# Patient Record
Sex: MALE | Race: WHITE | Employment: OTHER | URBAN - METROPOLITAN AREA
[De-identification: names, ages, dates, MRNs, and addresses within clinical notes are randomized per-mention and may not be internally consistent; named-entity substitution may affect disease eponyms.]

---

## 2021-10-30 ENCOUNTER — APPOINTMENT (OUTPATIENT)
Dept: CT IMAGING | Age: 86
DRG: 308 | End: 2021-10-30
Payer: OTHER GOVERNMENT

## 2021-10-30 ENCOUNTER — HOSPITAL ENCOUNTER (INPATIENT)
Age: 86
LOS: 2 days | Discharge: HOME | DRG: 308 | End: 2021-11-01
Attending: STUDENT IN AN ORGANIZED HEALTH CARE EDUCATION/TRAINING PROGRAM | Admitting: FAMILY MEDICINE
Payer: OTHER GOVERNMENT

## 2021-10-30 ENCOUNTER — APPOINTMENT (OUTPATIENT)
Dept: GENERAL RADIOLOGY | Age: 86
DRG: 308 | End: 2021-10-30
Payer: OTHER GOVERNMENT

## 2021-10-30 DIAGNOSIS — R55 SYNCOPE, UNSPECIFIED SYNCOPE TYPE: ICD-10-CM

## 2021-10-30 DIAGNOSIS — R53.83 FATIGUE, UNSPECIFIED TYPE: ICD-10-CM

## 2021-10-30 DIAGNOSIS — U07.1 COVID-19: ICD-10-CM

## 2021-10-30 DIAGNOSIS — I48.91 NEW ONSET ATRIAL FIBRILLATION (HCC): Primary | ICD-10-CM

## 2021-10-30 DIAGNOSIS — G56.31 RADIAL NERVE PALSY, RIGHT: ICD-10-CM

## 2021-10-30 LAB
ALBUMIN SERPL-MCNC: 3.6 G/DL (ref 3.5–5.2)
ALP BLD-CCNC: 84 U/L (ref 40–129)
ALT SERPL-CCNC: 15 U/L (ref 0–40)
ANION GAP SERPL CALCULATED.3IONS-SCNC: 11 MMOL/L (ref 7–16)
AST SERPL-CCNC: 23 U/L (ref 0–39)
BACTERIA: NORMAL /HPF
BASOPHILS ABSOLUTE: 0.03 E9/L (ref 0–0.2)
BASOPHILS RELATIVE PERCENT: 0.7 % (ref 0–2)
BILIRUB SERPL-MCNC: 0.7 MG/DL (ref 0–1.2)
BILIRUBIN URINE: NEGATIVE
BLOOD, URINE: ABNORMAL
BUN BLDV-MCNC: 18 MG/DL (ref 6–23)
CALCIUM SERPL-MCNC: 9 MG/DL (ref 8.6–10.2)
CHLORIDE BLD-SCNC: 104 MMOL/L (ref 98–107)
CLARITY: CLEAR
CO2: 24 MMOL/L (ref 22–29)
COLOR: YELLOW
CREAT SERPL-MCNC: 1.1 MG/DL (ref 0.7–1.2)
EOSINOPHILS ABSOLUTE: 0.02 E9/L (ref 0.05–0.5)
EOSINOPHILS RELATIVE PERCENT: 0.5 % (ref 0–6)
GFR AFRICAN AMERICAN: >60
GFR NON-AFRICAN AMERICAN: >60 ML/MIN/1.73
GLUCOSE BLD-MCNC: 96 MG/DL (ref 74–99)
GLUCOSE URINE: NEGATIVE MG/DL
HCT VFR BLD CALC: 40.8 % (ref 37–54)
HEMOGLOBIN: 13 G/DL (ref 12.5–16.5)
IMMATURE GRANULOCYTES #: 0.01 E9/L
IMMATURE GRANULOCYTES %: 0.2 % (ref 0–5)
KETONES, URINE: NEGATIVE MG/DL
LEUKOCYTE ESTERASE, URINE: NEGATIVE
LYMPHOCYTES ABSOLUTE: 0.93 E9/L (ref 1.5–4)
LYMPHOCYTES RELATIVE PERCENT: 21.6 % (ref 20–42)
MCH RBC QN AUTO: 29.1 PG (ref 26–35)
MCHC RBC AUTO-ENTMCNC: 31.9 % (ref 32–34.5)
MCV RBC AUTO: 91.5 FL (ref 80–99.9)
MONOCYTES ABSOLUTE: 0.73 E9/L (ref 0.1–0.95)
MONOCYTES RELATIVE PERCENT: 17 % (ref 2–12)
NEUTROPHILS ABSOLUTE: 2.58 E9/L (ref 1.8–7.3)
NEUTROPHILS RELATIVE PERCENT: 60 % (ref 43–80)
NITRITE, URINE: NEGATIVE
PDW BLD-RTO: 15.6 FL (ref 11.5–15)
PH UA: 6.5 (ref 5–9)
PLATELET # BLD: 214 E9/L (ref 130–450)
PMV BLD AUTO: 9.3 FL (ref 7–12)
POTASSIUM REFLEX MAGNESIUM: 4.5 MMOL/L (ref 3.5–5)
PROTEIN UA: NEGATIVE MG/DL
RBC # BLD: 4.46 E12/L (ref 3.8–5.8)
RBC UA: NORMAL /HPF (ref 0–2)
SARS-COV-2, NAAT: DETECTED
SODIUM BLD-SCNC: 139 MMOL/L (ref 132–146)
SPECIFIC GRAVITY UA: 1.02 (ref 1–1.03)
TOTAL PROTEIN: 7.1 G/DL (ref 6.4–8.3)
TROPONIN, HIGH SENSITIVITY: 27 NG/L (ref 0–11)
TROPONIN, HIGH SENSITIVITY: 29 NG/L (ref 0–11)
UROBILINOGEN, URINE: 0.2 E.U./DL
WBC # BLD: 4.3 E9/L (ref 4.5–11.5)
WBC UA: NORMAL /HPF (ref 0–5)

## 2021-10-30 PROCEDURE — 70450 CT HEAD/BRAIN W/O DYE: CPT

## 2021-10-30 PROCEDURE — 84484 ASSAY OF TROPONIN QUANT: CPT

## 2021-10-30 PROCEDURE — 93005 ELECTROCARDIOGRAM TRACING: CPT | Performed by: STUDENT IN AN ORGANIZED HEALTH CARE EDUCATION/TRAINING PROGRAM

## 2021-10-30 PROCEDURE — 99285 EMERGENCY DEPT VISIT HI MDM: CPT

## 2021-10-30 PROCEDURE — 87635 SARS-COV-2 COVID-19 AMP PRB: CPT

## 2021-10-30 PROCEDURE — 71045 X-RAY EXAM CHEST 1 VIEW: CPT

## 2021-10-30 PROCEDURE — 85025 COMPLETE CBC W/AUTO DIFF WBC: CPT

## 2021-10-30 PROCEDURE — 81001 URINALYSIS AUTO W/SCOPE: CPT

## 2021-10-30 PROCEDURE — 80053 COMPREHEN METABOLIC PANEL: CPT

## 2021-10-30 PROCEDURE — 2140000000 HC CCU INTERMEDIATE R&B

## 2021-10-30 NOTE — ED PROVIDER NOTES
ED  Provider Note  Admit Date/RoomTime: 10/30/2021  2:43 PM  ED Room: 31/31      History of Present Illness:  10/30/21, Time: 2:53 PM EDT  Chief Complaint   Patient presents with    Other     \"I woke up this morning and then sat in a chair and then it was 1pm!\" some dizziness with walking, right arm numbness when he woke up resolved now         Estefania Yates is a 80 y.o. male presenting to the ED for syncope. Patient was seen to lose consciousness while eating breakfast, unclear if fell asleep or passed out, dizzy afterwards with ambulation, no vision changes, woke up / came to and had R arm numbness now resolved, thinks he had it draped over chair   Onset: sudden   Timing: single episode    Duration: hours   Associated symptoms: dry cough x 2 weeks, multiple days of generalized malaise, rhinorrhea, no loss taste or smell, +_vax x 2, no N/V/D/C, no fevers. +chills, denies prodromal CP/SOB, no fall/trauma   Severity: severe   Exacerbated by: none   Relieved by: none         Review of Systems:   A complete review of systems was performed and pertinent positives and negatives are stated within HPI, all other systems reviewed and are negative.        --------------------------------------------- PATIENT HISTORY--------------------------------------------  Past Medical History:  has a past medical history of Hyperlipidemia and Hypertension. Past Surgical History:  has no past surgical history on file. Family History: family history is not on file. . Unless otherwise noted, family history is non contributory    Social History:  reports that he has never smoked. He has never used smokeless tobacco. He reports that he does not drink alcohol. The patients home medications have been reviewed. Allergies: Patient has no known allergies.     I have reviewed the past medical history, past surgical history, social history, and family history    ---------------------------------------------------PHYSICAL EXAM--------------------------------------    Constitutional/General: Alert and oriented x3  Head: Normocephalic and atraumatic  Eyes: PERRL, EOMI, sclera non icteric  ENT: Oropharynx clear, handling secretions, no trismus  Neck: Supple, full ROM, no stridor, no meningismus  Respiratory: LCTAB  Cardiovascular: RRR, no R/G/M, 2+ peripheral pulses  Chest: No chest wall tenderness, equal chest rise  Gastrointestinal:  S/nt/nd  Musculoskeletal: Extremities warm and well perfused, moving all extremities  Skin: skin warm and dry. No rashes. Neurologic: No focal deficits, strength and sensation grossly intact   Psychiatric: Normal Affect, behavior normal      ED Course as of Oct 31 0012   Sat Oct 30, 2021   1633 EKG: This EKG is signed and interpreted by me. Rate: 78  Rhythm: Atrial fibrillation  Interpretation: atrial fibrillation (new onset)  Comparison: changes compared to previous EKG, new afib      [RH]   7821 Patient with [id number] score 5 points. 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 Has history of hypertension, TIA.     [RH]   2000 Discussed case with Dr. Sharmaine Dowd, she agreed to admit patient. [RH]      ED Course User Index  [RH] Ryley Pandora Canny, DO       -------------------------------------------------- RESULTS -------------------------------------------------  I have personally reviewed all laboratory and imaging results for this patient. Results are listed below.      LABS: (Lab results interpreted by me)  Results for orders placed or performed during the hospital encounter of 10/30/21   COVID-19, Rapid   Result Value Ref Range    SARS-CoV-2, NAAT DETECTED (A) Not Detected   CBC Auto Differential   Result Value Ref Range    WBC 4.3 (L) 4.5 - 11.5 E9/L    RBC 4.46 3.80 - 5.80 E12/L    Hemoglobin 13.0 12.5 - 16.5 g/dL    Hematocrit 40.8 37.0 - 54.0 %    MCV 91.5 80.0 - 99.9 fL    MCH 29.1 26.0 - 35.0 pg    MCHC 31.9 (L) 32.0 - 34.5 %    RDW 15.6 (H) 11.5 - 15.0 fL    Platelets 878 222 - 340 E9/L    MPV 9.3 7.0 - 12.0 fL    Neutrophils % 60.0 43.0 - 80.0 %    Immature Granulocytes % 0.2 0.0 - 5.0 %    Lymphocytes % 21.6 20.0 - 42.0 %    Monocytes % 17.0 (H) 2.0 - 12.0 %    Eosinophils % 0.5 0.0 - 6.0 %    Basophils % 0.7 0.0 - 2.0 %    Neutrophils Absolute 2.58 1.80 - 7.30 E9/L    Immature Granulocytes # 0.01 E9/L    Lymphocytes Absolute 0.93 (L) 1.50 - 4.00 E9/L    Monocytes Absolute 0.73 0.10 - 0.95 E9/L    Eosinophils Absolute 0.02 (L) 0.05 - 0.50 E9/L    Basophils Absolute 0.03 0.00 - 0.20 E9/L   Comprehensive Metabolic Panel w/ Reflex to MG   Result Value Ref Range    Sodium 139 132 - 146 mmol/L    Potassium reflex Magnesium 4.5 3.5 - 5.0 mmol/L    Chloride 104 98 - 107 mmol/L    CO2 24 22 - 29 mmol/L    Anion Gap 11 7 - 16 mmol/L    Glucose 96 74 - 99 mg/dL    BUN 18 6 - 23 mg/dL    CREATININE 1.1 0.7 - 1.2 mg/dL    GFR Non-African American >60 >=60 mL/min/1.73    GFR African American >60     Calcium 9.0 8.6 - 10.2 mg/dL    Total Protein 7.1 6.4 - 8.3 g/dL    Albumin 3.6 3.5 - 5.2 g/dL    Total Bilirubin 0.7 0.0 - 1.2 mg/dL    Alkaline Phosphatase 84 40 - 129 U/L    ALT 15 0 - 40 U/L    AST 23 0 - 39 U/L   Troponin   Result Value Ref Range    Troponin, High Sensitivity 29 (H) 0 - 11 ng/L   Urinalysis, reflex to microscopic   Result Value Ref Range    Color, UA Yellow Straw/Yellow    Clarity, UA Clear Clear    Glucose, Ur Negative Negative mg/dL    Bilirubin Urine Negative Negative    Ketones, Urine Negative Negative mg/dL    Specific Gravity, UA 1.020 1.005 - 1.030    Blood, Urine SMALL (A) Negative    pH, UA 6.5 5.0 - 9.0    Protein, UA Negative Negative mg/dL    Urobilinogen, Urine 0.2 <2.0 E.U./dL    Nitrite, Urine Negative Negative    Leukocyte Esterase, Urine Negative Negative   Troponin   Result Value Ref Range    Troponin, High Sensitivity 27 (H) 0 - 11 ng/L   Microscopic Urinalysis   Result Value Ref Range    WBC, UA NONE 0 - 5 /HPF    RBC, UA 0-1 0 - 2 /HPF    Bacteria, UA NONE SEEN None Seen /HPF   EKG 12 Lead   Result Value Ref Range    Ventricular Rate 64 BPM    Atrial Rate 70 BPM    QRS Duration 84 ms    Q-T Interval 392 ms    QTc Calculation (Bazett) 404 ms    T Axis 7 degrees   ,       RADIOLOGY:  Imaging interpreted by Radiologist unless otherwise specified  CT Head WO Contrast   Final Result   1. No acute intracranial abnormality. 2. Subtle hyperdensity in the right posterior dre consistent with patient's   known cavernous malformation as was also demonstrated on the previous MRI of   the brain from 11/04/2016. XR CHEST PORTABLE   Final Result   No acute process. Date of EKG: 10/30/21    Rhythm: atrial fibrillation - controlled  Rate: normal  Axis: normal  Conduction: normal  ST Segments: normal  T Waves: normal    Clinical Impression: afib, rate controlled   Comparison to prior EKG: changes compared to previous EKG        ------------------------- NURSING NOTES AND VITALS REVIEWED ---------------------------  The nursing notes within the ED encounter and vital signs as below have been reviewed by myself  /82   Pulse 83   Temp 98 °F (36.7 °C) (Infrared)   Resp 18   SpO2 94%      The patients available past medical records and past encounters were reviewed. ------------------------------ ED COURSE/MEDICAL DECISION MAKING----------------------  Medications - No data to display    IDr. Miriamfast, am the primary provider of record    Medical Decision Making:   Keaton Edmonds is a 80 y.o. male presenting to the ED for syncope/near syncope. Also with symptoms concerning for COVID. Differential includes TIA/CVA, electrolyte abnormality, ICH, tumor/mass, arrhythmia, ACS/MI, medication effect, orthostasis, BPPV  Workup: CBC, Bmp, troponin, EKG, CT head     COVID+  New onset afib, controlled.   New onset confirmed with documentation review and conversation with patient. No anticoagulation. Chads vasc score is 5+ but hx intracranial pathology/AVM so will defer AC at this time   92% on RA      ED Counseling: This emergency provider has spoken with the patient and any family present to discuss clinical status, results, plan of care, diagnosis and prognosis as able to be determined at this time. Any questions were answered and patient and/or family/POA are agreeable with the plan.       --------------------------------- IMPRESSION AND DISPOSITION ---------------------------------    IMPRESSION  1. New onset atrial fibrillation (HCC)    2. Radial nerve palsy, right    3. Fatigue, unspecified type    4. COVID-19    5. Syncope, unspecified syncope type        DISPOSITION  Disposition: Admit to telemetry  Patient condition is stable      This report was transcribed using voice recognition software. Every effort was made to ensure accuracy; however, transcription errors may be present.          Yue Barraza MD  10/31/21 1329

## 2021-10-31 ENCOUNTER — APPOINTMENT (OUTPATIENT)
Dept: ULTRASOUND IMAGING | Age: 86
DRG: 308 | End: 2021-10-31
Payer: OTHER GOVERNMENT

## 2021-10-31 PROBLEM — U07.1 SARS-COV-2 POSITIVE: Status: ACTIVE | Noted: 2021-10-31

## 2021-10-31 PROBLEM — R55 SYNCOPE: Status: ACTIVE | Noted: 2021-10-31

## 2021-10-31 LAB
ANION GAP SERPL CALCULATED.3IONS-SCNC: 13 MMOL/L (ref 7–16)
BASOPHILS ABSOLUTE: 0.03 E9/L (ref 0–0.2)
BASOPHILS RELATIVE PERCENT: 0.5 % (ref 0–2)
BUN BLDV-MCNC: 15 MG/DL (ref 6–23)
C-REACTIVE PROTEIN: 0.9 MG/DL (ref 0–0.4)
C-REACTIVE PROTEIN: 1 MG/DL (ref 0–0.4)
CALCIUM SERPL-MCNC: 9.2 MG/DL (ref 8.6–10.2)
CHLORIDE BLD-SCNC: 103 MMOL/L (ref 98–107)
CO2: 21 MMOL/L (ref 22–29)
CREAT SERPL-MCNC: 1.1 MG/DL (ref 0.7–1.2)
D DIMER: <200 NG/ML DDU
EKG ATRIAL RATE: 70 BPM
EKG Q-T INTERVAL: 392 MS
EKG QRS DURATION: 84 MS
EKG QTC CALCULATION (BAZETT): 404 MS
EKG T AXIS: 7 DEGREES
EKG VENTRICULAR RATE: 64 BPM
EOSINOPHILS ABSOLUTE: 0.01 E9/L (ref 0.05–0.5)
EOSINOPHILS RELATIVE PERCENT: 0.2 % (ref 0–6)
FERRITIN: 62 NG/ML
GFR AFRICAN AMERICAN: >60
GFR NON-AFRICAN AMERICAN: >60 ML/MIN/1.73
GLUCOSE BLD-MCNC: 63 MG/DL (ref 74–99)
HCT VFR BLD CALC: 41.1 % (ref 37–54)
HEMOGLOBIN: 13.1 G/DL (ref 12.5–16.5)
IMMATURE GRANULOCYTES #: 0.01 E9/L
IMMATURE GRANULOCYTES %: 0.2 % (ref 0–5)
LYMPHOCYTES ABSOLUTE: 1.08 E9/L (ref 1.5–4)
LYMPHOCYTES RELATIVE PERCENT: 18.1 % (ref 20–42)
MCH RBC QN AUTO: 29.1 PG (ref 26–35)
MCHC RBC AUTO-ENTMCNC: 31.9 % (ref 32–34.5)
MCV RBC AUTO: 91.3 FL (ref 80–99.9)
MONOCYTES ABSOLUTE: 0.94 E9/L (ref 0.1–0.95)
MONOCYTES RELATIVE PERCENT: 15.7 % (ref 2–12)
NEUTROPHILS ABSOLUTE: 3.9 E9/L (ref 1.8–7.3)
NEUTROPHILS RELATIVE PERCENT: 65.3 % (ref 43–80)
PDW BLD-RTO: 15.9 FL (ref 11.5–15)
PLATELET # BLD: 209 E9/L (ref 130–450)
PMV BLD AUTO: 9.9 FL (ref 7–12)
POTASSIUM REFLEX MAGNESIUM: 4.6 MMOL/L (ref 3.5–5)
PROCALCITONIN: 0.07 NG/ML (ref 0–0.08)
RBC # BLD: 4.5 E12/L (ref 3.8–5.8)
SODIUM BLD-SCNC: 137 MMOL/L (ref 132–146)
WBC # BLD: 6 E9/L (ref 4.5–11.5)

## 2021-10-31 PROCEDURE — 2580000003 HC RX 258: Performed by: FAMILY MEDICINE

## 2021-10-31 PROCEDURE — 93971 EXTREMITY STUDY: CPT | Performed by: RADIOLOGY

## 2021-10-31 PROCEDURE — 6370000000 HC RX 637 (ALT 250 FOR IP): Performed by: STUDENT IN AN ORGANIZED HEALTH CARE EDUCATION/TRAINING PROGRAM

## 2021-10-31 PROCEDURE — 82728 ASSAY OF FERRITIN: CPT

## 2021-10-31 PROCEDURE — 6360000002 HC RX W HCPCS: Performed by: STUDENT IN AN ORGANIZED HEALTH CARE EDUCATION/TRAINING PROGRAM

## 2021-10-31 PROCEDURE — 87040 BLOOD CULTURE FOR BACTERIA: CPT

## 2021-10-31 PROCEDURE — 99222 1ST HOSP IP/OBS MODERATE 55: CPT | Performed by: INTERNAL MEDICINE

## 2021-10-31 PROCEDURE — 86140 C-REACTIVE PROTEIN: CPT

## 2021-10-31 PROCEDURE — APPSS60 APP SPLIT SHARED TIME 46-60 MINUTES: Performed by: NURSE PRACTITIONER

## 2021-10-31 PROCEDURE — 80048 BASIC METABOLIC PNL TOTAL CA: CPT

## 2021-10-31 PROCEDURE — 6360000002 HC RX W HCPCS: Performed by: FAMILY MEDICINE

## 2021-10-31 PROCEDURE — 36415 COLL VENOUS BLD VENIPUNCTURE: CPT

## 2021-10-31 PROCEDURE — 2140000000 HC CCU INTERMEDIATE R&B

## 2021-10-31 PROCEDURE — 6370000000 HC RX 637 (ALT 250 FOR IP): Performed by: NURSE PRACTITIONER

## 2021-10-31 PROCEDURE — 84145 PROCALCITONIN (PCT): CPT

## 2021-10-31 PROCEDURE — 93970 EXTREMITY STUDY: CPT

## 2021-10-31 PROCEDURE — 85025 COMPLETE CBC W/AUTO DIFF WBC: CPT

## 2021-10-31 PROCEDURE — 93010 ELECTROCARDIOGRAM REPORT: CPT | Performed by: INTERNAL MEDICINE

## 2021-10-31 PROCEDURE — 85378 FIBRIN DEGRADE SEMIQUANT: CPT

## 2021-10-31 PROCEDURE — 6370000000 HC RX 637 (ALT 250 FOR IP): Performed by: FAMILY MEDICINE

## 2021-10-31 RX ORDER — LISINOPRIL 5 MG/1
2.5 TABLET ORAL DAILY
Status: DISCONTINUED | OUTPATIENT
Start: 2021-11-01 | End: 2021-11-01 | Stop reason: HOSPADM

## 2021-10-31 RX ORDER — VIT C/E/CUPERIC/ZINC/LUTEIN 226-90-0.8
1 CAPSULE ORAL DAILY
COMMUNITY

## 2021-10-31 RX ORDER — SODIUM CHLORIDE 0.9 % (FLUSH) 0.9 %
5-40 SYRINGE (ML) INJECTION EVERY 12 HOURS SCHEDULED
Status: DISCONTINUED | OUTPATIENT
Start: 2021-10-31 | End: 2021-11-01 | Stop reason: HOSPADM

## 2021-10-31 RX ORDER — POLYETHYLENE GLYCOL 3350 17 G/17G
17 POWDER, FOR SOLUTION ORAL DAILY PRN
Status: DISCONTINUED | OUTPATIENT
Start: 2021-10-31 | End: 2021-11-01 | Stop reason: HOSPADM

## 2021-10-31 RX ORDER — METOPROLOL SUCCINATE 25 MG/1
25 TABLET, EXTENDED RELEASE ORAL DAILY
Status: DISCONTINUED | OUTPATIENT
Start: 2021-10-31 | End: 2021-10-31

## 2021-10-31 RX ORDER — ONDANSETRON 2 MG/ML
4 INJECTION INTRAMUSCULAR; INTRAVENOUS EVERY 6 HOURS PRN
Status: DISCONTINUED | OUTPATIENT
Start: 2021-10-31 | End: 2021-11-01 | Stop reason: HOSPADM

## 2021-10-31 RX ORDER — ONDANSETRON 4 MG/1
4 TABLET, ORALLY DISINTEGRATING ORAL EVERY 8 HOURS PRN
Status: DISCONTINUED | OUTPATIENT
Start: 2021-10-31 | End: 2021-11-01 | Stop reason: HOSPADM

## 2021-10-31 RX ORDER — ONDANSETRON 4 MG/1
4 TABLET, ORALLY DISINTEGRATING ORAL EVERY 8 HOURS PRN
Status: DISCONTINUED | OUTPATIENT
Start: 2021-10-31 | End: 2021-10-31

## 2021-10-31 RX ORDER — GUAIFENESIN/DEXTROMETHORPHAN 100-10MG/5
5 SYRUP ORAL EVERY 4 HOURS PRN
Status: DISCONTINUED | OUTPATIENT
Start: 2021-10-31 | End: 2021-11-01 | Stop reason: HOSPADM

## 2021-10-31 RX ORDER — MAGNESIUM HYDROXIDE/ALUMINUM HYDROXICE/SIMETHICONE 120; 1200; 1200 MG/30ML; MG/30ML; MG/30ML
30 SUSPENSION ORAL EVERY 6 HOURS PRN
Status: DISCONTINUED | OUTPATIENT
Start: 2021-10-31 | End: 2021-11-01 | Stop reason: HOSPADM

## 2021-10-31 RX ORDER — ONDANSETRON 2 MG/ML
4 INJECTION INTRAMUSCULAR; INTRAVENOUS EVERY 6 HOURS PRN
Status: DISCONTINUED | OUTPATIENT
Start: 2021-10-31 | End: 2021-10-31

## 2021-10-31 RX ORDER — ASPIRIN 81 MG/1
81 TABLET ORAL DAILY
Status: DISCONTINUED | OUTPATIENT
Start: 2021-10-31 | End: 2021-11-01 | Stop reason: HOSPADM

## 2021-10-31 RX ORDER — ATORVASTATIN CALCIUM 40 MG/1
40 TABLET, FILM COATED ORAL NIGHTLY
Status: DISCONTINUED | OUTPATIENT
Start: 2021-10-31 | End: 2021-10-31

## 2021-10-31 RX ORDER — METOPROLOL TARTRATE 5 MG/5ML
5 INJECTION INTRAVENOUS EVERY 6 HOURS PRN
Status: DISCONTINUED | OUTPATIENT
Start: 2021-10-31 | End: 2021-11-01 | Stop reason: HOSPADM

## 2021-10-31 RX ORDER — ACETAMINOPHEN 650 MG/1
650 SUPPOSITORY RECTAL EVERY 6 HOURS PRN
Status: DISCONTINUED | OUTPATIENT
Start: 2021-10-31 | End: 2021-11-01 | Stop reason: HOSPADM

## 2021-10-31 RX ORDER — SODIUM CHLORIDE 0.9 % (FLUSH) 0.9 %
5-40 SYRINGE (ML) INJECTION PRN
Status: DISCONTINUED | OUTPATIENT
Start: 2021-10-31 | End: 2021-11-01 | Stop reason: HOSPADM

## 2021-10-31 RX ORDER — METOPROLOL SUCCINATE 25 MG/1
25 TABLET, EXTENDED RELEASE ORAL 2 TIMES DAILY
Status: DISCONTINUED | OUTPATIENT
Start: 2021-10-31 | End: 2021-11-01 | Stop reason: HOSPADM

## 2021-10-31 RX ORDER — LISINOPRIL 5 MG/1
5 TABLET ORAL DAILY
Status: DISCONTINUED | OUTPATIENT
Start: 2021-10-31 | End: 2021-10-31

## 2021-10-31 RX ORDER — TAMSULOSIN HYDROCHLORIDE 0.4 MG/1
0.4 CAPSULE ORAL DAILY
Status: DISCONTINUED | OUTPATIENT
Start: 2021-10-31 | End: 2021-11-01 | Stop reason: HOSPADM

## 2021-10-31 RX ORDER — GABAPENTIN 100 MG/1
100 CAPSULE ORAL 3 TIMES DAILY PRN
COMMUNITY

## 2021-10-31 RX ORDER — ACETAMINOPHEN 325 MG/1
650 TABLET ORAL EVERY 6 HOURS PRN
Status: DISCONTINUED | OUTPATIENT
Start: 2021-10-31 | End: 2021-11-01 | Stop reason: HOSPADM

## 2021-10-31 RX ORDER — METOPROLOL SUCCINATE 25 MG/1
25 TABLET, EXTENDED RELEASE ORAL DAILY
Status: ON HOLD | COMMUNITY
End: 2021-11-01 | Stop reason: HOSPADM

## 2021-10-31 RX ORDER — CARVEDILOL 3.12 MG/1
3.12 TABLET ORAL 2 TIMES DAILY WITH MEALS
Status: DISCONTINUED | OUTPATIENT
Start: 2021-10-31 | End: 2021-10-31

## 2021-10-31 RX ORDER — SODIUM CHLORIDE 9 MG/ML
25 INJECTION, SOLUTION INTRAVENOUS PRN
Status: DISCONTINUED | OUTPATIENT
Start: 2021-10-31 | End: 2021-11-01 | Stop reason: HOSPADM

## 2021-10-31 RX ORDER — CHOLECALCIFEROL (VITAMIN D3) 50 MCG
2000 TABLET ORAL DAILY
Status: DISCONTINUED | OUTPATIENT
Start: 2021-10-31 | End: 2021-11-01 | Stop reason: HOSPADM

## 2021-10-31 RX ADMIN — TAMSULOSIN HYDROCHLORIDE 0.4 MG: 0.4 CAPSULE ORAL at 10:27

## 2021-10-31 RX ADMIN — GUAIFENESIN SYRUP AND DEXTROMETHORPHAN 5 ML: 100; 10 SYRUP ORAL at 23:30

## 2021-10-31 RX ADMIN — CARVEDILOL 3.12 MG: 3.12 TABLET, FILM COATED ORAL at 10:40

## 2021-10-31 RX ADMIN — METOPROLOL SUCCINATE 25 MG: 25 TABLET, FILM COATED, EXTENDED RELEASE ORAL at 21:24

## 2021-10-31 RX ADMIN — Medication 10 ML: at 10:28

## 2021-10-31 RX ADMIN — ASPIRIN 81 MG: 81 TABLET, COATED ORAL at 10:27

## 2021-10-31 RX ADMIN — Medication 10 ML: at 22:06

## 2021-10-31 RX ADMIN — LISINOPRIL 5 MG: 5 TABLET ORAL at 10:27

## 2021-10-31 RX ADMIN — Medication 2000 UNITS: at 10:27

## 2021-10-31 RX ADMIN — ATORVASTATIN CALCIUM 40 MG: 40 TABLET, FILM COATED ORAL at 05:12

## 2021-10-31 RX ADMIN — APIXABAN 5 MG: 5 TABLET, FILM COATED ORAL at 21:24

## 2021-10-31 RX ADMIN — ENOXAPARIN SODIUM 60 MG: 100 INJECTION SUBCUTANEOUS at 10:40

## 2021-10-31 RX ADMIN — ENOXAPARIN SODIUM 30 MG: 100 INJECTION SUBCUTANEOUS at 05:12

## 2021-10-31 ASSESSMENT — PAIN SCALES - GENERAL
PAINLEVEL_OUTOF10: 0

## 2021-10-31 NOTE — ED NOTES
Ambulatory POX     At rest HR , POX 93% RA.     Ambulating in magana HR , POX 92-94%, patient in no apparent distress, reports no difficulty breathing     Yuval Phelps, RN  10/30/21 2482
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
## 2021-10-31 NOTE — PROGRESS NOTES
Maintain off of supplemental oxygen  - No indication to treat COVID-19 at current  - Maintain O2 sat>92%  - Pt on Eliquis at home, cont  - Cont on home Toprol XL-25mg PO BID; up titrate as tolerated  - Home Lisinopril decreased to 2.5mg PO QD  - Cont to encourage PO hydration  - Obtain Orthostatics  - F/U TTE to r/o structural HD  - Cardiology c/s noted and appreciated; following  - Mg>2, K>4  - Maintain on tele  - Frequent neuro checks  - CTH on admission without acute intracranial abnormalities. Chronic findings noted. - Maintain fall precautions  - PT/OT  - Supportive Care  - Maintain on Enhanced droplet precautions    DISPOSITION: Intermediate    Medications:  REVIEWED DAILY    Infusion Medications    sodium chloride       Scheduled Medications    aspirin  81 mg Oral Daily    tamsulosin  0.4 mg Oral Daily    sodium chloride flush  5-40 mL IntraVENous 2 times per day    vitamin D  2,000 Units Oral Daily    apixaban  5 mg Oral BID    metoprolol succinate  25 mg Oral BID    [START ON 11/1/2021] lisinopril  2.5 mg Oral Daily     PRN Meds: sodium chloride flush, sodium chloride, ondansetron **OR** ondansetron, polyethylene glycol, acetaminophen **OR** acetaminophen, perflutren lipid microspheres, aluminum & magnesium hydroxide-simethicone, guaiFENesin-dextromethorphan, metoprolol    Labs:     Recent Labs     10/30/21  1558 10/31/21  0347   WBC 4.3* 6.0   HGB 13.0 13.1   HCT 40.8 41.1    209       Recent Labs     10/30/21  1558 10/31/21  0347    137   K 4.5 4.6    103   CO2 24 21*   BUN 18 15   CREATININE 1.1 1.1   CALCIUM 9.0 9.2       Recent Labs     10/30/21  1558   PROT 7.1   ALKPHOS 84   ALT 15   AST 23   BILITOT 0.7       No results for input(s): INR in the last 72 hours. No results for input(s): Lola Kras in the last 72 hours.     Chronic labs:    Lab Results   Component Value Date    CHOL 174 11/04/2016    TRIG 98 11/04/2016    HDL 48 11/04/2016    LDLCALC 106 (H) 11/04/2016    INR 1.1 11/03/2016    LABA1C 6.1 (H) 11/04/2016       Radiology: REVIEWED DAILY    +++++++++++++++++++++++++++++++++++++++++++++++++  Atif Bustamante MD  [facility] [address]  +++++++++++++++++++++++++++++++++++++++++++++++++  NOTE: This report was transcribed using voice recognition software. Every effort was made to ensure accuracy; however, inadvertent computerized transcription errors may be present.

## 2021-10-31 NOTE — H&P
Hospital Medicine History & Physical      PCP: No primary care provider on file. Date of Admission: 10/30/2021    Date of Service: Pt seen/examined on 10/30/2021 and Admitted to Inpatient with expected LOS greater than two midnights due to medical therapy. Chief Complaint:  Syncope       History Of Present Illness: 80 y.o. male with past medical history of hypertension and hyperlipidemia . Patient presents to the ED after a syncopal episode. Patient states that he was eating breakfast at 9 am this  morning and lost consciousness and woke up at 1300 . It is unclear whether patient fell asleep or passed out . Patient states that when he woke up he had right arm numbness  Patient was found by his  son . Patient reports no chest pain shortness of breath fever chill diarrhea  . He states that he has had a non productive cough for the past 10 days . Patient is fully vaccinated and received a booster dose . Felisha Vu He reports no recent trauma or falls . In the ED patient was afebrile hemodynamically stable . Lab data reveal  COVID 19  positive , Trop 29, 27 EKG revealed atrial fibrillation with HR 64  creatinine 1.1 CT head no acute abnormality  WBC 4.3 HGB  13.0 . CXR no acute process      Past Medical History:          Diagnosis Date    Hyperlipidemia     Hypertension        Past Surgical History:      History reviewed. No pertinent surgical history. Medications Prior to Admission:      Prior to Admission medications    Medication Sig Start Date End Date Taking?  Authorizing Provider   aspirin 81 MG EC tablet Take 1 tablet by mouth daily 11/5/16   Izzy Bender MD   atorvastatin (LIPITOR) 40 MG tablet Take 1 tablet by mouth nightly 11/5/16   Izzy Bender MD   Plant Sterols and Stanols (CHOLESTOFF PLUS) 450 MG CAPS Take 1 capsule by mouth daily    Historical Provider, MD   tamsulosin (FLOMAX) 0.4 MG capsule Take 0.4 mg by mouth daily    Historical Provider, MD   vitamin B-12 (CYANOCOBALAMIN) 1000 MCG tablet Take 500 mcg by mouth daily     Historical Provider, MD   lisinopril (PRINIVIL;ZESTRIL) 5 MG tablet Take 5 mg by mouth daily     Historical Provider, MD   acetaminophen (TYLENOL) 325 MG tablet Take 650 mg by mouth every 4 hours as needed for Pain     Historical Provider, MD   vitamin D 1000 UNITS CAPS Take 1 capsule by mouth daily     Historical Provider, MD       Allergies:  Patient has no known allergies. Social History:      The patient currently lives with family     TOBACCO:   reports that he has never smoked. He has never used smokeless tobacco.  ETOH:   reports no history of alcohol use. Family History:       Reviewed in detail and negative for DM, CAD, Cancer, CVA. Positive as follows:    History reviewed. No pertinent family history. REVIEW OF SYSTEMS:   Pertinent positives as noted in the HPI. All other systems reviewed and negative. PHYSICAL EXAM:    /78   Pulse 82   Temp 98.2 °F (36.8 °C) (Infrared)   Resp 16   SpO2 96%     General appearance:  No apparent distress, appears stated age and cooperative. HEENT:  Normal cephalic, atraumatic without obvious deformity. Pupils equal, round, and reactive to light. Extra ocular muscles intact. Conjunctivae/corneas clear. Neck: Supple, with full range of motion. No jugular venous distention. Trachea midline. Respiratory:  Normal respiratory effort. Clear to auscultation, bilaterally without Rales/Wheezes/Rhonchi. Cardiovascular:  Regular rate and rhythm with normal S1/S2 without murmurs, rubs or gallops. Abdomen: Soft, non-tender, non-distended with normal bowel sounds. Musculoskeletal:  No clubbing, cyanosis or edema bilaterally. Full range of motion without deformity. Skin: Skin color, texture, turgor normal.  No rashes or lesions. Neurologic:  Neurovascularly intact without any focal sensory/motor deficits.  Cranial nerves: II-XII intact, grossly non-focal.  Psychiatric:  Alert and oriented, thought content appropriate, normal insight    CXR:  I have reviewed the CXR   EKG:  I have reviewed the EKG     Labs:     Recent Labs     10/30/21  1558   WBC 4.3*   HGB 13.0   HCT 40.8        Recent Labs     10/30/21  1558      K 4.5      CO2 24   BUN 18   CREATININE 1.1   CALCIUM 9.0     Recent Labs     10/30/21  1558   AST 23   ALT 15   BILITOT 0.7   ALKPHOS 84     No results for input(s): INR in the last 72 hours. No results for input(s): Yael Alexander in the last 72 hours. Urinalysis:      Lab Results   Component Value Date    NITRU Negative 10/30/2021    WBCUA NONE 10/30/2021    BACTERIA NONE SEEN 10/30/2021    RBCUA 0-1 10/30/2021    BLOODU SMALL 10/30/2021    SPECGRAV 1.020 10/30/2021    GLUCOSEU Negative 10/30/2021       CXR   No acute process.             CT HEAD   .  No acute intracranial abnormality. 2. Subtle hyperdensity in the right posterior dre consistent with patient's   known cavernous malformation as was also demonstrated on the previous MRI of   the brain from 11/04/2016. ASSESSMENT:    Active Hospital Problems    Diagnosis Date Noted    Atrial fibrillation, new onset [facility]) [I48.91] 10/30/2021       PLAN:    New onset atrial fibrillation  Patient has no prior history of afib . HR was 64 on admission . Patient is COVID positive which may likely have been a trigger . Trop 29, 27   NIGEL vasc score is 3 which would warrant anticoagulation . Admit inpatient vitals telemetry cardiology consult 2 D echo     COVID 19 infection :patient is fully vaccinated and received booster dose . CXR on admission no acute process . UA neg for infection  . Patient is on room air continue to monitor supportive care     Syncope :  CT head revealed no acute process unclear whether he passed out or fell asleep . Since patient was was found in atrial fibrillation ordered venous us  lower extremities to rule out DVT   orthostatic vitals 2D echo and cardiology consult    Hypertension :c/w Lisinopril

## 2021-11-01 VITALS
SYSTOLIC BLOOD PRESSURE: 129 MMHG | TEMPERATURE: 98 F | WEIGHT: 140 LBS | HEIGHT: 66 IN | BODY MASS INDEX: 22.5 KG/M2 | DIASTOLIC BLOOD PRESSURE: 73 MMHG | HEART RATE: 93 BPM | RESPIRATION RATE: 18 BRPM | OXYGEN SATURATION: 94 %

## 2021-11-01 LAB
C-REACTIVE PROTEIN: 2.8 MG/DL (ref 0–0.4)
D DIMER: <200 NG/ML DDU
EKG ATRIAL RATE: 357 BPM
EKG Q-T INTERVAL: 356 MS
EKG QRS DURATION: 84 MS
EKG QTC CALCULATION (BAZETT): 405 MS
EKG R AXIS: -4 DEGREES
EKG T AXIS: 11 DEGREES
EKG VENTRICULAR RATE: 78 BPM
METER GLUCOSE: 117 MG/DL (ref 74–99)
TSH SERPL DL<=0.05 MIU/L-ACNC: 3.59 UIU/ML (ref 0.27–4.2)

## 2021-11-01 PROCEDURE — 6370000000 HC RX 637 (ALT 250 FOR IP): Performed by: FAMILY MEDICINE

## 2021-11-01 PROCEDURE — 97165 OT EVAL LOW COMPLEX 30 MIN: CPT

## 2021-11-01 PROCEDURE — 84443 ASSAY THYROID STIM HORMONE: CPT

## 2021-11-01 PROCEDURE — 97530 THERAPEUTIC ACTIVITIES: CPT

## 2021-11-01 PROCEDURE — 85378 FIBRIN DEGRADE SEMIQUANT: CPT

## 2021-11-01 PROCEDURE — 6370000000 HC RX 637 (ALT 250 FOR IP): Performed by: STUDENT IN AN ORGANIZED HEALTH CARE EDUCATION/TRAINING PROGRAM

## 2021-11-01 PROCEDURE — 97161 PT EVAL LOW COMPLEX 20 MIN: CPT

## 2021-11-01 PROCEDURE — 86140 C-REACTIVE PROTEIN: CPT

## 2021-11-01 PROCEDURE — 36415 COLL VENOUS BLD VENIPUNCTURE: CPT

## 2021-11-01 PROCEDURE — 97535 SELF CARE MNGMENT TRAINING: CPT

## 2021-11-01 PROCEDURE — 82962 GLUCOSE BLOOD TEST: CPT

## 2021-11-01 PROCEDURE — 6370000000 HC RX 637 (ALT 250 FOR IP): Performed by: NURSE PRACTITIONER

## 2021-11-01 PROCEDURE — 99232 SBSQ HOSP IP/OBS MODERATE 35: CPT | Performed by: INTERNAL MEDICINE

## 2021-11-01 RX ORDER — LISINOPRIL 2.5 MG/1
2.5 TABLET ORAL DAILY
Qty: 30 TABLET | Refills: 3 | Status: SHIPPED | OUTPATIENT
Start: 2021-11-02 | End: 2021-11-01

## 2021-11-01 RX ORDER — LISINOPRIL 2.5 MG/1
2.5 TABLET ORAL DAILY
Qty: 30 TABLET | Refills: 2 | Status: SHIPPED | OUTPATIENT
Start: 2021-11-02 | End: 2022-01-31

## 2021-11-01 RX ORDER — METOPROLOL SUCCINATE 25 MG/1
25 TABLET, EXTENDED RELEASE ORAL 2 TIMES DAILY
Qty: 60 TABLET | Refills: 2 | Status: SHIPPED | OUTPATIENT
Start: 2021-11-01 | End: 2022-01-30

## 2021-11-01 RX ORDER — METOPROLOL SUCCINATE 25 MG/1
25 TABLET, EXTENDED RELEASE ORAL 2 TIMES DAILY
Qty: 30 TABLET | Refills: 3 | Status: SHIPPED | OUTPATIENT
Start: 2021-11-01 | End: 2021-11-01

## 2021-11-01 RX ADMIN — ASPIRIN 81 MG: 81 TABLET, COATED ORAL at 09:11

## 2021-11-01 RX ADMIN — METOPROLOL SUCCINATE 25 MG: 25 TABLET, FILM COATED, EXTENDED RELEASE ORAL at 09:12

## 2021-11-01 RX ADMIN — TAMSULOSIN HYDROCHLORIDE 0.4 MG: 0.4 CAPSULE ORAL at 09:11

## 2021-11-01 RX ADMIN — Medication 2000 UNITS: at 09:12

## 2021-11-01 RX ADMIN — APIXABAN 5 MG: 5 TABLET, FILM COATED ORAL at 09:11

## 2021-11-01 RX ADMIN — LISINOPRIL 2.5 MG: 5 TABLET ORAL at 09:12

## 2021-11-01 NOTE — PLAN OF CARE
Problem: Airway Clearance - Ineffective  Goal: Achieve or maintain patent airway  Outcome: Met This Shift     Problem: Gas Exchange - Impaired  Goal: Promote optimal lung function  Outcome: Met This Shift     Problem:  Body Temperature -  Risk of, Imbalanced  Goal: Complications related to the disease process, condition or treatment will be avoided or minimized  Outcome: Met This Shift

## 2021-11-01 NOTE — PLAN OF CARE
Problem: Airway Clearance - Ineffective  Goal: Achieve or maintain patent airway  11/1/2021 0609 by Emy Escobar RN  Outcome: Met This Shift     Problem: Gas Exchange - Impaired  Goal: Absence of hypoxia  11/1/2021 0609 by Emy Escobar RN  Outcome: Met This Shift     Problem: Breathing Pattern - Ineffective  Goal: Ability to achieve and maintain a regular respiratory rate  11/1/2021 0609 by Emy Escobar RN  Outcome: Met This Shift  10/31/2021 2228 by Colten Ace RN  Outcome: Met This Shift

## 2021-11-01 NOTE — PROGRESS NOTES
Dr Mahin Norton paged via perfect serve as patient fills prescriptions at the [place] and needs printed prescriptions for discharge instead of prescriptions from meds to beds.

## 2021-11-01 NOTE — PROGRESS NOTES
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        Precautions:  Fall Risk, Droplet plus (COVID-19), bed alarm     Assessment of current deficits   [x] Functional mobility  [x]ADLs  [x] Strength               []Cognition    [x] Functional transfers   [x] IADLs         [x] Safety Awareness   [x]Endurance    [] Fine Coordination              [x] Balance      [] Vision/perception   []Sensation     []Gross Motor Coordination  [] ROM  [] Delirium                   [] Motor Control     OT PLAN OF CARE   OT POC based on physician orders, patient diagnosis and results of clinical assessment    Frequency/Duration 1-3 days/wk for 2 weeks PRN   Specific OT Treatment Interventions to include:   * Instruction/training on adapted ADL techniques and AE recommendations to increase functional independence within precautions       * Training on energy conservation strategies, correct breathing pattern and techniques to improve independence/tolerance for self-care routine  * Functional transfer/mobility training/DME recommendations for increased independence, safety, and fall prevention  * Patient/Family education to increase follow through with safety techniques and functional independence  * Recommendation of environmental modifications for increased safety with functional transfers/mobility and ADLs  * Therapeutic exercise to improve motor endurance, ROM, and functional strength for ADLs/functional transfers  * Therapeutic activities to facilitate/challenge dynamic balance, stand tolerance for increased safety and independence with ADLs    Recommended Adaptive Equipment:  TBD     Home Living: Pt lives with son in a 1 story home with 2 DUSTIN. Full flight to basement with B hand rails. Bathroom setup: walk-in shower    Equipment owned: none    Prior Level of Function: Independent with ADLs , Independent with IADLs; ambulated without AD   Driving: yes   Occupation: retired    Pain Level: Pt reports R shoulder pain rated at 4-5/10;  Therapist facilitated repositioning to address pain      Cognition: A&O: 4/4; Follows 2 step directions   Memory:  good   Sequencing:  good   Problem solving:  fair   Judgement/safety:  fair     Functional Assessment:  AM-PAC Daily Activity Raw Score: 19/24   Initial Eval Status  Date: 11/1/21 Treatment Status  Date: STGs = LTGs  Time frame: 10-14 days   Feeding Independent      Grooming Contact Guard Assist     Standing   Modified [place]    UB Dressing Supervision   Modified [place]    LB Dressing Stand by Assist   Modified [place]    Bathing Stand by Assist  Modified [place]     Toileting Stand by Assist   Modified [place]    Bed Mobility  Supine to sit: Stand by Assist   Sit to supine: NT   Supine to sit: Modified [place]   Sit to supine: Modified [place]    Functional Transfers Stand by Assist   Modified [place]    Functional Mobility Minimal Assist     Ambulated to/from bathroom with w/w; cuing on posture, w/w management and safety   Modified [place]    Balance Sitting:     Static:  sup    Dynamic:Sup-SBA  Standing: SBA - minimal Assist     Activity Tolerance F-  F+ Visual/  Perceptual wfl                  Vitals: On RA  Supine: O2 sat 94%, HR 81 bpm,  /86   Seated: O2 sat 96%, HR 85 bpm, /66  Standing: O2 sat 95%, HR 85 bpm, /75  ADL task: O2 sat 95%,  bpm  Ambulation to bathroom / simulated toileting task : O2 sat 94%, HR 98 bpm  Standing grooming task, ambulation to chair with w/w: O2 sat 95%, HR 96 bpm  5x sit to stand: O2 sat 95%  Rest in chair:  O2 sat 98%       Hand Dominance right   Strength ROM Additional Info:    RUE   4-/5 wfl good  and wfl FMC/dexterity noted during ADL tasks     LUE 4-/5 wfl good  and wfl FMC/dexterity noted during ADL tasks     Hearing: wfl  Sensation:wfl  Tone: wfl  Edema:none noted     Comments: Upon arrival patient supine in bed and agreeable to OT session. Therapist educated pt on role of OT. At end of session, patient seated in chair (nursing clearance obtained)  with call light and phone within reach. Overall patient demonstrated decreased independence and safety during completion of ADL/functional transfer/mobility tasks. Pt would benefit from continued skilled OT to increase safety and independence with completion of ADL/IADL tasks for functional independence and quality of life. Treatment: OT treatment provided this date includes: Facilitation of bed mobility, unsupported sitting balance (impacting ADLs; addressing posture, weight shifting, dynamic reaching), functional transfers (various surfaces: bed, toilet, chair), standing tolerance tasks (addressing posture, balance and activity tolerance while incorporating light functional reaching; impacting ADLs and functional activity) and functional ambulation tasks with w/w (including to/ from bathroom and in preparation for item retrieval tasks; cuing on posture, w/w management and safety) - skilled cuing on hand placement, posture, body mechanics, energy conservation techniques and safety.   Therapist facilitated self-care retraining: UB/LB self-care tasks (gown, socks), simulated toileting task and standing grooming tasks while educating pt on modified techniques, posture, safety and energy conservation techniques. Skilled monitoring of HR, O2 sats and pts response to treatment. Rehab Potential: Good  for established goals     Patient / Family Goal: return home      Patient and/or family were instructed on functional diagnosis, prognosis/goals and OT plan of care. Demonstrated fair understanding. Eval Complexity: Low    Time In: 1010  Time Out: 1050  Total Treatment Time: 24 minutes    Min Units   OT Eval Low 97165  x  1   OT Eval Medium 01928      OT Eval High 81485      OT Re-Eval [id number]       Therapeutic Ex 03161       Therapeutic Activities 47929  14  1   ADL/Self Care 96312  10  1   Orthotic Management 66319       Manual [id number]     Neuro Re-Ed 90010       Non-Billable Time          Evaluation Time additionally includes thorough review of current medical information, gathering information on past medical history/social history and prior level of function, interpretation of standardized testing/informal observation of tasks, assessment of data and development of plan of care and goals.           Africa Colon OTR/L #[id number]

## 2021-11-01 NOTE — PROGRESS NOTES
succinate  25 mg Oral BID    lisinopril  2.5 mg Oral Daily       IV Infusions (if any):   sodium chloride           PHYSICAL EXAM:     CONSTITUTIONAL:   /73   Pulse 93   Temp 98 °F (36.7 °C) (Oral)   Resp 18   Ht 5' 6\" (1.676 m)   Wt 140 lb (63.5 kg)   SpO2 94%   BMI 22.60 kg/m²   Pulse  Av.3  Min: 79  Max: 921  Systolic (73ZPU), IGC:127 , Min:110 , NOP:273    Diastolic (20STR), HZY:77, Min:70, Max:83      Patient was not seen in person due to COVID-19 status, to limit personal exposure and limit PPE utilization. Telephone interview attempted via his room phone #[phone], called, he did not answer. .            Impression/Recommendations:     PAF  -  High CXP4HY5 VASc score - ? Hx of A fib-On Eliquis at home. Rate control with BB; check TSH; Lovenox for anticoagulation changed to his home Eliquis 5mg BID. Decrease Eliquis dose to 2.5mg IF his Weight <60kg or Cr >1.5 in the future.  2D Echo ordered.     Questionable syncope - Monitor HR for any coni or tachyarrhythmias; Monitor BP     Essential HTN - Controlled     COVID-19 infection - Per Dr Phoenix Public     Hx of CVA in             BP and HR stable, Cardiology will see as needed.     F/u at [facility] in [place] or with [facility] Cardiology at [facility] office in 1 month        Electronically signed by Aris Burciaga MD on 2021 at [address] [place] [address] Cardiology

## 2021-11-01 NOTE — PLAN OF CARE
Problem: Cardiac:  Goal: Ability to maintain an adequate cardiac output will improve  Outcome: Met This Shift       Problem: Cardiac:  Goal: Complications related to the disease process, condition or treatment will be avoided or minimized  Outcome: Met This Shift

## 2021-11-01 NOTE — PROGRESS NOTES
Patient given discharge instructions including two printed prescriptions to be filled at his pharmacy of choice. Instructions and med changes explained to patient. Patient verbalized understanding and denied questions. PIV removed, no complications.

## 2021-11-01 NOTE — DISCHARGE SUMMARY
Hospitalist Discharge Summary    Patient ID: Raquel Boothe   Patient : [id number]  Patient's PCP: No primary care provider on file. Admit Date: 10/30/2021   Admitting Physician: Lianna Hobson MD    Discharge Date:  2021   Discharge Physician: Prem Busby MD   Discharge Condition: Stable  Discharge Disposition: [facility] course in brief:  (Please refer to daily progress notes for a comprehensive review of the hospitalization by requesting medical records)    HPI per Dr. Gavin Buck: 80 y.o. male with past medical history of hypertension and hyperlipidemia . Patient presents to the ED after a syncopal episode. Patient states that he was eating breakfast at 9 am this  morning and lost consciousness and woke up at 1300 . It is unclear whether patient fell asleep or passed out . Patient states that when he woke up he had right arm numbness  Patient was found by his  son . Patient reports no chest pain shortness of breath fever chill diarrhea  . He states that he has had a non productive cough for the past 10 days . Patient is fully vaccinated and received a booster dose . Tova Timmons He reports no recent trauma or falls . In the ED patient was afebrile hemodynamically stable . Lab data reveal  COVID 19  positive , Trop 29, 27 EKG revealed atrial fibrillation with HR 64  creatinine 1.1 CT head no acute abnormality  WBC 4.3 HGB  13.0 . CXR no acute process.     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    Essential hypertension 2016    HLD (hyperlipidemia) 2016    BPH (benign prostatic hyperplasia) 2016      PLAN:  - Pt on RA in NAD  - Is fully vaccinated with booster for COVID-19  - Maintained off of supplemental oxygen  - No indication to treat COVID-19 at current  - O2 sats were maintained>92%  - Pt on Eliquis at home, cont  - Cont on home Toprol XL-25mg PO BID  - Home Lisinopril decreased to 2.5mg PO QD  - Cont to encourage PO hydration  - Orthostatics negative  - Cardiology c/s noted and appreciated; follow-up as outpatient  - Mg>2, K>4  - Maintained on tele  - Frequent neuro checks ensured, no further episodes of endorsed syncope  - CTH on admission without acute intracranial abnormalities. Chronic findings noted. - Maintained fall precautions  - Maintain on Enhanced droplet precautions    Pt seen and examined at bedside in NAD. He denies any further episodes of syncope, orthostatics were negative. Pt denies any chest pain, palpitations or SOB. Pt is hemodynamically stable for discharge. Consults:   IP CONSULT TO INTERNAL MEDICINE  IP CONSULT TO CARDIOLOGY    Discharge Instructions / Follow up:    Cardiology  PCP    Continued appropriate risk factor modification of blood pressure, diabetes and serum lipids will remain essential to reducing risk of future atherosclerotic development    Activity: activity as tolerated    Significant labs:  CBC:   Recent Labs     10/30/21  1558 10/31/21  0347   WBC 4.3* 6.0   RBC 4.46 4.50   HGB 13.0 13.1   HCT 40.8 41.1   MCV 91.5 91.3   RDW 15.6* 15.9*    209     BMP:   Recent Labs     10/30/21  1558 10/31/21  0347    137   K 4.5 4.6    103   CO2 24 21*   BUN 18 15   CREATININE 1.1 1.1     LFT:  Recent Labs     10/30/21  1558   PROT 7.1   ALKPHOS 84   ALT 15   AST 23   BILITOT 0.7     PT/INR: No results for input(s): INR, APTT in the last 72 hours. BNP: No results for input(s): BNP in the last 72 hours.   Hgb A1C:   Lab Results   Component Value Date    LABA1C 6.1 (H) 11/04/2016     Folate and B12: No results found for: [id number], No results found for: FOLATE  Thyroid Studies: No results found for: TSH, Z1UUWYA, P6YHKWN, THYROIDAB    Urinalysis:    Lab Results Component Value Date    NITRU Negative 10/30/2021    WBCUA NONE 10/30/2021    BACTERIA NONE SEEN 10/30/2021    RBCUA 0-1 10/30/2021    BLOODU SMALL 10/30/2021    SPECGRAV 1.020 10/30/2021    GLUCOSEU Negative 10/30/2021       Imaging:  CT Head WO Contrast    Result Date: 10/30/2021  EXAMINATION: CT OF THE HEAD WITHOUT CONTRAST  10/30/2021 5:05 pm TECHNIQUE: CT of the head was performed without the administration of intravenous contrast. Dose modulation, iterative reconstruction, and/or weight based adjustment of the mA/kV was utilized to reduce the radiation dose to as low as reasonably achievable. COMPARISON: For MRI of the brain, 11/04/2016 HISTORY: ORDERING SYSTEM PROVIDED HISTORY: Episode of severe fatigue, possible syncope, transient right arm paresthesias TECHNOLOGIST PROVIDED HISTORY: Reason for exam:->Episode of severe fatigue, possible syncope, transient right arm paresthesias Has a \"code stroke\" or \"stroke alert\" been called? ->No Decision Support Exception - unselect if not a suspected or confirmed emergency medical condition->Emergency Medical Condition (MA) What reading provider will be dictating this exam?->CRC FINDINGS: BRAIN/VENTRICLES: No mass effect, edema or hemorrhage is seen. Mild volume loss is seen in the cerebrum with mild chronic microvascular ischemic changes. No hydrocephalus or extra-axial fluid is seen. Mild vertebrobasilar dolichoectasia is seen. Subtle hyperdensity in the right posterior dre (series 2, image 15) corresponds with patient's known cavernous malformation. ORBITS: The visualized portion of the orbits demonstrate no acute abnormality. SINUSES: The visualized paranasal sinuses and mastoid air cells demonstrate no acute abnormality. SOFT TISSUES/SKULL:  No acute abnormality of the visualized skull or soft tissues. 1.  No acute intracranial abnormality.  2. Subtle hyperdensity in the right posterior dre consistent with patient's known cavernous malformation as was also demonstrated on the previous MRI of the brain from 2016. XR CHEST PORTABLE    Result Date: 10/30/2021  EXAMINATION: ONE XRAY VIEW OF THE CHEST 10/30/2021 3:42 pm COMPARISON: 2016 HISTORY: ORDERING SYSTEM PROVIDED HISTORY: Fatigue, lightheadedness, concern for pneumonia. TECHNOLOGIST PROVIDED HISTORY: Reason for exam:->Fatigue, lightheadedness, concern for pneumonia. What reading provider will be dictating this exam?->CRC FINDINGS: The lungs are without acute focal process. There is no effusion or pneumothorax. The cardiomediastinal silhouette is without acute process. The osseous structures are without acute process mildly diminished inspiratory effort. Dense atherosclerotic calcification of the thoracic aortic arch without aneurysm. No acute process. US DUP LOWER EXTREMITIES BILATERAL VENOUS    Result Date: 10/31/2021  Patient MRN:  [id number] : 1929 Age: 80 years Gender: Male Order Date:  10/31/2021 10:49 AM EXAM: US DUP LOWER EXTREMITIES BILATERAL VENOUS NUMBER OF IMAGES:  52 INDICATION:  syncope syncope What reading provider will be dictating this exam?->[facility] COMPARISON: None Within the visualized vessels, there is no evidence for deep venous thrombosis There is good compressibility, there is good augmentation, there is good color flow.      Within the visualized vessels there is no evidence for deep venous thrombosis       Discharge Medications:      Medication List      CHANGE how you take these medications    lisinopril 2.5 MG tablet  Commonly known as: PRINIVIL;ZESTRIL  Take 1 tablet by mouth daily  Start taking on: 2021  What changed:   · medication strength  · how much to take     metoprolol succinate 25 MG extended release tablet  Commonly known as: TOPROL XL  Take 1 tablet by mouth 2 times daily  What changed: when to take this        CONTINUE taking these medications    acetaminophen 325 MG tablet  Commonly known as: TYLENOL     aspirin 81 MG EC tablet  Take 1 tablet by mouth daily     Eliquis 5 MG Tabs tablet  Generic drug: apixaban     gabapentin 100 MG capsule  Commonly known as: NEURONTIN     PreserVision/Lutein Caps     tamsulosin 0.4 MG capsule  Commonly known as: FLOMAX     vitamin B-12 1000 MCG tablet  Commonly known as: CYANOCOBALAMIN     vitamin D 25 MCG (1000 UT) Caps           Where to Get Your Medications      These medications were sent to [facility] [address] [address]    Phone: [phone]   · lisinopril 2.5 MG tablet  · metoprolol succinate 25 MG extended release tablet         Time Spent on discharge is more than 45 minutes in the examination, evaluation, counseling and review of medications and discharge plan.    +++++++++++++++++++++++++++++++++++++++++++++++++  MD MARIANN Gill/ Naga Saenz[address]  +++++++++++++++++++++++++++++++++++++++++++++++++  NOTE: This report was transcribed using voice recognition software. Every effort was made to ensure accuracy; however, inadvertent computerized transcription errors may be present.

## 2021-11-01 NOTE — PROGRESS NOTES
transfers from surfaces of varying heights (EOB x2, commode x1, chair x6). o Ambulation: Pt ambulated x2 reps with and without [employer]. Pt was cued for [employer] technique and safety. o Vitals and symptoms were closely monitored throughout session during activity and rest breaks. Orthostatics were taken in supine, sitting, and standing positions. o Education: Pt was educated on orthostatic hypotension and fall prevention.  Therapeutic exercise: Pt completed 6x STS exercise as noted above. Pt's/family goals:  1. To return home. Prognosis is Good for reaching above PT goals. Patient and or family understand(s) diagnosis, prognosis, and plan of care. Yes. 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     Current Treatment Recommendations:     [x] Strengthening to improve independence with functional mobility   [] ROM to improve independence with functional mobility   [x] Balance Training to improve static/dynamic balance and to reduce fall risk  [x] Endurance Training to improve activity tolerance during functional mobility   [x] Transfer Training to improve safety and independence with all functional transfers   [x] Gait Training to improve gait mechanics, endurance and assess need for appropriate assistive device  [x] Stair Training in preparation for safe discharge home and/or into the community   [] Positioning to prevent skin breakdown and contractures  [x] Safety and Education Training   [] Patient/Caregiver Education   [] HEP  [] Other     PT long term treatment goals are located in above grid    Frequency of treatments: 2-5x/week x 2-3 days. Time in  0955  Time out  1030    Total Treatment Time  23 minutes     Evaluation Time includes thorough review of current medical information, gathering information on past medical history/social history and prior level of function, completion of standardized testing/informal observation of tasks, assessment of data and education on plan of care and goals.     CPT codes:  [x] Low Complexity PT evaluation 41671  [] Moderate Complexity PT evaluation 88461  [] High Complexity PT evaluation 40795  [] PT Re-evaluation 98981  [] Gait training 63348 0 minutes  [] Manual therapy 64058 0 minutes  [x] Therapeutic activities 67862 23 minutes  [] Therapeutic exercises 64010 0 minutes  [] Neuromuscular reeducation 18556 0 minutes     Kimberlyn Montez, PT, DPT  [id number]

## 2021-11-02 ENCOUNTER — CARE COORDINATION (OUTPATIENT)
Dept: CASE MANAGEMENT | Age: 86
End: 2021-11-02

## 2021-11-02 NOTE — CARE COORDINATION
[facility] Initial Follow Up Call    *Pt does not qualify for BPCI program d/t [facility] is [facility] with [facility] being secondary* ~confirmed 21    Call within 2 business days of discharge: Yes    Patient: Alan Spencer Patient :    MRN: [id number]  Reason for Admission: new onset a-fib  Discharge Date: 21 RARS: Readmission Risk Score: 9.2      Last Discharge [facility]       Complaint Diagnosis Description Type Department Provider    10/30/21 Other New onset atrial fibrillation ([facility] . .. ED to Hosp-Admission (Discharged) (ADMITTED) SEY[facility]  Shimon Carcamo MD; Osmel Rogers,... Patient contacted regarding COVID-19 diagnosis. Discussed COVID-19 related testing which was available at this time. Test results were positive. Patient informed of results, if available? Yes. Care Transition Nurse contacted the patient by telephone to perform post discharge assessment. Call within 2 business days of discharge: Yes. Provided introduction to self, and explanation of the CTN/ACM role, and reason for call due to risk factors for infection and/or exposure to COVID-19. Symptoms reviewed with patient who verbalized the following symptoms: cough, no new symptoms and no worsening symptoms. Pt states that he feels \"fine\" today and only complaint pt offers is a productive cough and \"scratchy\" throat. Pt denies any sob, wheezing, chest tightness, fever, or chills. Pt picked up new rx at the [facility]. Medication changes reviewed with pt on this call and he voiced understanding. Pt was receptive to  contacting the [place] regarding a HFU appt. Per cardiology IP notes (Dr. Rick Current), pt will need to f[facility] mon with [facility] or with [place] [address] cardiology. Pt voiced understanding. Pt voiced no concerns/needs. Pt agreeable to continued outreaches. Due to no new or worsening symptoms encounter was not routed to provider for escalation.  Discussed follow-up appointments. If no appointment was previously scheduled, appointment scheduling offered: Yes.~Pt receptive to help with HFU scheduling. Pt active with pcp at [place] on [facility]. CTN will route message to  to f/u to schedule a f/u appt for pt. [facility] follow up appointment(s): No future appointments. Non-[facility] follow up appointment(s): Dr. Governor Triplett ([facility] to f/u with [place]     Non-face-to-face services provided:  Scheduled appointment with PCP-Dr. Indra Emerson specialist [facility]  Obtained and reviewed discharge summary and/or continuity of care documents  Assessment and support for treatment adherence and medication management-Full med review completed. 1111F not entered, as no  pcp. Pt picked up new rx at [place]. Reviewed medication changes. Advance Care Planning:   Does patient have an Advance Directive:  health care decision maker information reviewed. Educated patient about risk for severe COVID-19 due to risk factors according to CDC guidelines. CTN reviewed discharge instructions, medical action plan and red flag symptoms with the patient who verbalized understanding. Discussed exposure protocols and quarantine with CDC Guidelines. Patient was given an opportunity to verbalize any questions and concerns and agrees to contact CTN or health care provider for questions related to their healthcare. Reviewed and educated patient on any new and changed medications related to discharge diagnosis     Was patient discharged with a pulse oximeter? No       CTN provided contact information. Plan for follow-up call in 5-7 days based on severity of symptoms and risk factors.

## 2021-11-03 ENCOUNTER — CARE COORDINATION (OUTPATIENT)
Dept: CASE MANAGEMENT | Age: 86
End: 2021-11-03

## 2021-11-03 NOTE — CARE COORDINATION
Request from [address], RAMONA;, please schedule patient for hospital f/u with [facility] with patient, [facility].      Madi Led, [address] [facility] Coordination Transition

## 2021-11-05 LAB
BLOOD CULTURE, ROUTINE: NORMAL
CULTURE, BLOOD 2: NORMAL

## 2021-11-10 ENCOUNTER — CARE COORDINATION (OUTPATIENT)
Dept: CASE MANAGEMENT | Age: 86
End: 2021-11-10

## 2021-11-10 NOTE — CARE COORDINATION
BPCI-A PROGRAM QUALIFYING DRG [id number] (Final  confirmed on 11/10/21)    ICD-10 Code U07.1 (covid-19) confirmed for anchor admission 10/30/21      [facility]) COVID/BPCI Care Transitions Follow Up Call    11/10/2021    Patient: Stefani Gagnon  Patient : [id number]   MRN: [id number]  Reason for Admission: new onset a-fib (+COVID 10/30/21)  Discharge Date: 21 RARS: Readmission Risk Score: 9.2      Care Transition Nurse contacted the patient by telephone to perform follow-up assessment. Patient has following risk factors of: htn, afib,hld. Symptoms reviewed with patient who verbalized the following symptoms: cough, no new symptoms and no worsening symptoms. Pt reports to feeling \"good. \" Pt's only c/o today was ongoing cough. Pt reports to minimal mucus production. Pt denies sob, wheezing, or chest tightness. Pt discharged from [facility] on 21 with new onset a-fib and +COVID-19 10/30/21. Pt denies any CP/discomfort, dizziness/lightheadednes, syncope, or palpitations. Pt has contacted the [place] and has a scheduled appt on 21 @ 11 am. Pt voiced no needs/concerns today. Pt agreeable to continued outreaches from CTN. CTN reviewed medical action plan and red flag symptoms with the patient who verbalized understanding. Patient was given an opportunity to verbalize any questions and concerns and agrees to contact CTN or health care provider for questions related to their healthcare. CTN provided contact information. Plan for follow-up call in 7-10 days based on severity of symptoms and risk factors.

## 2021-11-23 ENCOUNTER — CARE COORDINATION (OUTPATIENT)
Dept: CASE MANAGEMENT | Age: 86
End: 2021-11-23

## 2021-11-23 NOTE — CARE COORDINATION
BPCI-A PROGRAM QUALIFYING DRG [id number] (Final  confirmed on 11/10/21)    ICD-10 Code U07.1 (covid-19) confirmed for anchor admission 10/30/21    [facility]) COVID/BPCI Care Transitions Follow Up Call    2021    Patient: Raquel Boothe  Patient : [id number]   MRN: [id number]  Reason for Admission: new onset a-fib  Discharge Date: 21 RARS: Readmission Risk Score: 9.2      Care Transition Nurse contacted the patient by telephone to perform follow-up assessment. Patient has following risk factors of: A-fib, htn, hld, hx CVA. Symptoms reviewed with patient who verbalized the following symptoms: no new symptoms and no worsening symptoms. Pt discharged from [facility] on 21 with new onset a-fib and +COVID-19 10/30/21. Pt reports that he is doing \"good\" and voiced no complaints. Pt denies sob, cough, wheezing, congestion, or chest tightness. Pt denies any syncopal episodes, dizziness/lightheadedness, CP, or palpitations. Pt reports that he has a f/u with his pcp at the [place] tomorrow at 11 am via phone visit. Pt voiced no needs/concerns today. Pt was agreeable to continued outreaches from this CTN. CTN reviewed medical action plan and red flag symptoms with the patient who verbalized understanding. Discussed exposure protocols and quarantine with CDC Guidelines. Patient was given an opportunity to verbalize any questions and concerns and agrees to contact CTN or health care provider for questions related to their healthcare. CTN provided contact information. Plan for follow-up call in 10-14 days based on severity of symptoms and risk factors.

## 2021-12-07 ENCOUNTER — CARE COORDINATION (OUTPATIENT)
Dept: CASE MANAGEMENT | Age: 86
End: 2021-12-07

## 2021-12-07 NOTE — CARE COORDINATION
ICD-10 Code U07.1 (covid-19) confirmed for anchor admission 10/30/21    [employer] COVID/BPCICare Transitions Follow Up Call    2021    Patient: Min Milton  Patient :    MRN: [id number]  Reason for Admission: New onset a-fib, +COVID-19  Discharge Date: 21 RARS: Readmission Risk Score: 9.2         Attempted to reach the patient for sub BPCI/COVID Monitoring Care Transition call post hospital discharge. Phone rang multiple times with no VM available to leave a message. No further outreaches will be made. CTN will continue to follow pt peripherally for BPCI/COVID care monitoring episode. Follow Up  No future appointments.     Jinny Barrera RN

## 2023-05-19 ENCOUNTER — APPOINTMENT (OUTPATIENT)
Dept: CT IMAGING | Age: 88
End: 2023-05-19
Payer: OTHER GOVERNMENT

## 2023-05-19 ENCOUNTER — HOSPITAL ENCOUNTER (EMERGENCY)
Age: 88
Discharge: HOME | End: 2023-05-19
Attending: STUDENT IN AN ORGANIZED HEALTH CARE EDUCATION/TRAINING PROGRAM
Payer: OTHER GOVERNMENT

## 2023-05-19 VITALS
OXYGEN SATURATION: 96 % | RESPIRATION RATE: 18 BRPM | DIASTOLIC BLOOD PRESSURE: 77 MMHG | BODY MASS INDEX: 22.5 KG/M2 | TEMPERATURE: 98.2 F | WEIGHT: 140 LBS | SYSTOLIC BLOOD PRESSURE: 160 MMHG | HEIGHT: 66 IN | HEART RATE: 69 BPM

## 2023-05-19 DIAGNOSIS — S01.01XA LACERATION OF SCALP, INITIAL ENCOUNTER: ICD-10-CM
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
## 2023-05-19 DIAGNOSIS — S09.90XA CLOSED HEAD INJURY, INITIAL ENCOUNTER: ICD-10-CM

## 2023-05-19 PROCEDURE — 70450 CT HEAD/BRAIN W/O DYE: CPT

## 2023-05-19 PROCEDURE — 72125 CT NECK SPINE W/O DYE: CPT

## 2023-05-19 PROCEDURE — 99284 EMERGENCY DEPT VISIT MOD MDM: CPT

## 2023-05-19 PROCEDURE — 6360000002 HC RX W HCPCS: Performed by: STUDENT IN AN ORGANIZED HEALTH CARE EDUCATION/TRAINING PROGRAM

## 2023-05-19 PROCEDURE — 90471 IMMUNIZATION ADMIN: CPT | Performed by: STUDENT IN AN ORGANIZED HEALTH CARE EDUCATION/TRAINING PROGRAM

## 2023-05-19 PROCEDURE — 12002 RPR S/N/AX/GEN/TRNK2.6-7.5CM: CPT

## 2023-05-19 PROCEDURE — 90714 TD VACC NO PRESV 7 YRS+ IM: CPT | Performed by: STUDENT IN AN ORGANIZED HEALTH CARE EDUCATION/TRAINING PROGRAM

## 2023-05-19 RX ORDER — TETANUS AND DIPHTHERIA TOXOIDS ADSORBED 2; 2 [LF]/.5ML; [LF]/.5ML
0.5 INJECTION INTRAMUSCULAR ONCE
Status: COMPLETED | OUTPATIENT
Start: 2023-05-19 | End: 2023-05-19

## 2023-05-19 RX ORDER — LIDOCAINE HYDROCHLORIDE AND EPINEPHRINE 10; 10 MG/ML; UG/ML
20 INJECTION, SOLUTION INFILTRATION; PERINEURAL ONCE
Status: DISCONTINUED | OUTPATIENT
Start: 2023-05-19 | End: 2023-05-19 | Stop reason: HOSPADM

## 2023-05-19 RX ADMIN — TETANUS AND DIPHTHERIA TOXOIDS ADSORBED 0.5 ML: 2; 2 INJECTION INTRAMUSCULAR at 07:36

## 2023-05-19 ASSESSMENT — LIFESTYLE VARIABLES
HOW OFTEN DO YOU HAVE A DRINK CONTAINING ALCOHOL: MONTHLY OR LESS
HOW MANY STANDARD DRINKS CONTAINING ALCOHOL DO YOU HAVE ON A TYPICAL DAY: 1 OR 2

## 2023-05-19 NOTE — ED TRIAGE NOTES
Department of Emergency Medicine  FIRST PROVIDER TRIAGE NOTE             Independent MLP           5/19/23  12:58 AM EDT    Date of Encounter: 5/19/23   MRN: [id number]      HPI: Godwin Heller is a 80 y.o. male who presents to the ED for Fall (Fall from sitting position, hit head on cement floor. +thinners. Lac on top of head. Denies neck pain. +back pain. Fell to knees. Unknown LOC. )       ROS: Negative for cp or sob. PE: Gen Appearance/Constitutional: alert  HEENT: NC/NT. PERRLA,  Airway patent. Initial Plan of Care: All treatment areas with department are currently occupied. Plan to order/Initiate the following while awaiting opening in ED: imaging studies.   Initiate Treatment-Testing, Proceed toTreatment Area When Bed Available for ED Attending/MLP to Continue Care    Electronically signed by CHARANJIT Tolentino CNP   DD: 5/19/23

## 2023-05-19 NOTE — ED PROVIDER NOTES
[facility] ENCOUNTER        Pt Name: Bar Huitron  MRN: [id number]  [place] 6/30/1929  Date of evaluation: 5/19/2023  Provider: Vianey Jama DO  PCP: No primary care provider on file. Note Started: 7:16 AM EDT 5/19/23    CHIEF COMPLAINT       Chief Complaint   Patient presents with    Fall     Fall from sitting position, hit head on cement floor. +thinners. Lac on top of head. Denies neck pain. +back pain. Fell to knees. Unknown LOC. C-collar placed in triage. HISTORY OF PRESENT ILLNESS: 1 or more Elements   History From: patient and son    Limitations to history : None    Bar Huitron is a 80 y.o. male with a history of atrial fibrillation anticoagulated on Eliquis, hypertension, hyperlipidemia who presents to the emergency department complaining of a fall. The patient symptoms were sudden onset last night, persistent, mild in severity, nothing makes it better or worse. Patient states that he was sitting down in a chair in the basement watching television and eating dinner when he fell asleep. He states that he fell forward and landed on his knees and then hit his head off of the floor. Patient states that he always feels asleep in the chair every night and he typically just sleeps in the chair but this time he fell forward causing him to hit his head. Triage note states patient has back pain, but the patient does not complain of pain anywhere besides stating his knees are little sore. However, he called his son to help him and he was immediately will stand up and was able to ambulate after the incident occurred. Patient does not know when his last tetanus shot was. He denies any chest pain, shortness of breath, lightheadedness, dizziness, numbness, tingling, unilateral weakness, abdominal pain, other injuries, or other acute symptoms or concerns.     Nursing Notes were all reviewed and agreed